# Patient Record
Sex: MALE | URBAN - METROPOLITAN AREA
[De-identification: names, ages, dates, MRNs, and addresses within clinical notes are randomized per-mention and may not be internally consistent; named-entity substitution may affect disease eponyms.]

---

## 2017-07-24 ENCOUNTER — OFFICE VISIT (OUTPATIENT)
Dept: OPHTHALMOLOGY | Facility: CLINIC | Age: 42
End: 2017-07-24
Attending: OPHTHALMOLOGY
Payer: COMMERCIAL

## 2017-07-24 DIAGNOSIS — H31.093: ICD-10-CM

## 2017-07-24 DIAGNOSIS — H52.223 REGULAR ASTIGMATISM OF BOTH EYES: ICD-10-CM

## 2017-07-24 DIAGNOSIS — H52.13 HIGH MYOPIA, BOTH EYES: Primary | ICD-10-CM

## 2017-07-24 PROCEDURE — 99215 OFFICE O/P EST HI 40 MIN: CPT | Mod: ZF

## 2017-07-24 PROCEDURE — 92015 DETERMINE REFRACTIVE STATE: CPT | Mod: ZF

## 2017-07-24 ASSESSMENT — EXTERNAL EXAM - RIGHT EYE: OD_EXAM: NORMAL

## 2017-07-24 ASSESSMENT — REFRACTION_MANIFEST
OS_SPHERE: -12.00
OS_ADD: +1.50
OD_AXIS: 075
OS_AXIS: 090
OD_CYLINDER: +4.50
OD_SPHERE: -7.00
OD_ADD: +1.50
OS_CYLINDER: +3.50

## 2017-07-24 ASSESSMENT — REFRACTION_WEARINGRX
SPECS_TYPE: SVL
OD_SPHERE: -6.75
OD_CYLINDER: +4.75
OD_AXIS: 077
OS_SPHERE: -8.25
OS_AXIS: 089
OS_CYLINDER: +3.25

## 2017-07-24 ASSESSMENT — CONF VISUAL FIELD
OS_NORMAL: 1
OD_NORMAL: 1

## 2017-07-24 ASSESSMENT — REFRACTION
OS_SPHERE: -10.75
OS_CYLINDER: +3.50
OS_AXIS: 095

## 2017-07-24 ASSESSMENT — EXTERNAL EXAM - LEFT EYE: OS_EXAM: NORMAL

## 2017-07-24 ASSESSMENT — TONOMETRY
OS_IOP_MMHG: 19
IOP_METHOD: TONOPEN
OD_IOP_MMHG: 19

## 2017-07-24 ASSESSMENT — VISUAL ACUITY
CORRECTION_TYPE: GLASSES
OD_CC+: -3
OS_CC: 20/60
METHOD: SNELLEN - LINEAR
OS_PH_CC: 20/40-2
OD_CC: 20/20

## 2017-07-24 ASSESSMENT — SLIT LAMP EXAM - LIDS
COMMENTS: NORMAL
COMMENTS: NORMAL

## 2017-07-24 ASSESSMENT — CUP TO DISC RATIO
OS_RATIO: 0.3
OD_RATIO: 0.3

## 2017-07-24 NOTE — MR AVS SNAPSHOT
After Visit Summary   2017    Oliver Ye    MRN: [id number]           Patient Information     Date Of Birth          1975        Visit Information        Provider Department      2017 12:30 PM [facility]        Today's Diagnoses     High myopia, both eyes    -  1    Regular astigmatism of both eyes        Peripheral scars of the chorioretina, bilateral           Follow-ups after your visit        Follow-up notes from your care team     Return in about 3 months (around 10/24/2017) for macula OCT OU.      Who to contact     Please call your clinic at [phone] to:    Ask questions about your health    Make or cancel appointments    Discuss your medicines    Learn about your test results    Speak to your doctor   If you have compliments or concerns about an experience at your clinic, or if you wish to file a complaint, please contact [facility] Patient Relations at [phone] or email us at [email or web address]         Additional Information About Your Visit        MyChart Information     [employer] is an electronic gateway that provides easy, online access to your medical records. With [employer], you can request a clinic appointment, read your test results, renew a prescription or communicate with your care team.     To sign up for [employer] visit the website at [email or web address]   You will be asked to enter the access code listed below, as well as some personal information. Please follow the directions to create your username and password.     Your access code is: [email or web address]  Expires: 10/17/2017  6:30 AM     Your access code will  in 90 days. If you need help or a new code, please contact your [facility] or call [phone] for assistance.        Care EveryWhere ID     This is your Care EveryWhere ID. This could be used by other organizations to access your [facility]  records  [id number]         Blood Pressure from Last 3 Encounters:   No data found for BP    Weight from Last 3 Encounters:   No data found for Wt              Today, you had the following     No orders found for display       Primary Care Provider    None Specified       No primary provider on file.        Equal Access to Services     KEYSHAWN POLK : Hadii baljit bingham viet Cano, waclaudiada luqadaha, aliciata kaalmada eliza, kulwinder ginain hayaadiogo ibrahimcarl ferraro dc . So [facility] [phone].    ATENCIÓN: Si habla español, tiene a muir disposición servicios gratuitos de asistencia lingüística. Llame al [phone].    We comply with applicable federal civil rights laws and [place] laws. We do not discriminate on the basis of race, color, national origin, age, disability sex, sexual orientation or gender identity.            Thank you!     Thank you for choosing [facility]  for your care. Our goal is always to provide you with excellent care. Hearing back from our patients is one way we can continue to improve our services. Please take a few minutes to complete the written survey that you may receive in the mail after your visit with us. Thank you!             Your Updated Medication List - Protect others around you: Learn how to safely use, store and throw away your medicines at www.disposemymeds.org.          This list is accurate as of: 7/24/17 11:59 PM.  Always use your most recent med list.                   Brand Name Dispense Instructions for use Diagnosis    BIOTIN PO      Only occasional        MULTIVITAMIN ADULT PO      Take 1 tablet by mouth daily        VITAMIN D-3 PO      Take 3,000 Int'l Units by mouth every 7 days

## 2017-07-24 NOTE — NURSING NOTE
Chief Complaints and History of Present Illnesses   Patient presents with     Consult For     left eye blurred vision at distance     HPI    Affected eye(s):  Left   Symptoms:     Blurred vision (Comment: left eye)   Decreased vision   Floaters (Comment: longstanding/stable)   No flashes   No redness   Tearing (Comment: left eye > right)      Duration:  1 year      Do you have eye pain now?:  No      Comments:  Pt has noticed blurred vision in the left eye at distance - also notes more tearing in the left eye  Pt also has headache pain by the left temple - notes once a week  Pt's most recent eye exam about a year ago at [facility]    No drops    Elizabeth BOYKIN 12:44 PM July 24, 2017

## 2017-07-24 NOTE — PROGRESS NOTES
HPI  Oliver Ye is a 42 year old male with history of choroid-retinal scarring here for follow up. Reports vision is left eye is not as clear, not sure if it is worse... He might just be noticing it more. Report new left-sided headaches 1x week, started one month ago, more epiphora left eye recently. Works on computers all day. Floaters stable. Denies flashes of light, itchiness, or photophobia.     POH:  - Chorioretinal scars, OU   PMH: none   FH: no glaucoma, no AMD  SH: no smoking   Meds: none     Assessment & Plan    (H52.13) High myopia, both eyes  (primary encounter diagnosis)  (H52.223) Regular astigmatism of both eyes  Comment: Relatively large myopic shift left eye with BCVA of 20/30. Given increasing myopia and high astigmatism, obtained topography which shows regular bow-tie, not indicative of keratoconus. No notable cataract asymmetry to explain shift. Obtain retina OCT next visit to evaluate for mildly limited acuity.   Plan: Given updated glasses Rx.   Discussed signs/sx of RT/RD and the patient knows to call immediately if they develop these symptoms.     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  Plan: Observe    -----------------------------------------------------------------------------------    Patient disposition:   Return in about 3 months (around 10/24/2017) for macula OCT OU. or sooner as needed.    Teaching statement:  Complete documentation of historical and exam elements from today's encounter can be found in the full encounter summary report (not reduplicated in this progress note). I personally obtained the chief complaint(s) and history of present illness.  I confirmed and edited as necessary the review of systems, past medical/surgical history, family history, social history, and examination findings as documented by others; and I examined the patient myself. I personally reviewed the relevant tests, images,  and reports as documented above.     I formulated and edited as necessary the assessment and plan and discussed the findings and management plan with the patient and family.    Elizabeth Holguin MD  Comprehensive Ophthalmology & Ocular Pathology  Department of Ophthalmology and Visual Neurosciences  [email or web address]  Pager [phone]

## 2017-07-31 ASSESSMENT — CONF VISUAL FIELD: METHOD: COUNTING FINGERS
